# Patient Record
Sex: MALE | Race: AMERICAN INDIAN OR ALASKA NATIVE | NOT HISPANIC OR LATINO | URBAN - METROPOLITAN AREA
[De-identification: names, ages, dates, MRNs, and addresses within clinical notes are randomized per-mention and may not be internally consistent; named-entity substitution may affect disease eponyms.]

---

## 2023-10-30 ENCOUNTER — CLINICAL SUPPORT (OUTPATIENT)
Dept: CARDIAC REHAB | Facility: CLINIC | Age: 78
End: 2023-10-30

## 2023-11-29 ENCOUNTER — CLINICAL SUPPORT (OUTPATIENT)
Dept: CARDIAC REHAB | Facility: CLINIC | Age: 78
End: 2023-11-29

## 2023-11-29 PROCEDURE — 9430000001 HC PHASE III CARDIAC REHAB MONTHLY FEE

## 2023-11-30 NOTE — PROGRESS NOTES
Patient has attended Phase III [facility] at least one time this month. A bill for $55 will be sent out at the end of the month. Thank you for participating in our phase III program.

## 2023-12-04 ENCOUNTER — CLINICAL SUPPORT (OUTPATIENT)
Dept: CARDIAC REHAB | Facility: CLINIC | Age: 78
End: 2023-12-04

## 2023-12-04 PROCEDURE — 9430000001 HC PHASE III CARDIAC REHAB MONTHLY FEE

## 2023-12-10 NOTE — PROGRESS NOTES
Patient has attended Phase III [facility] Rehabilitation at least one time this month (October). A bill for $55 will be sent out at the end of the month. Thank you for participating in our phase III program.

## 2023-12-31 NOTE — PROGRESS NOTES
Patient has attended Phase III [facility] at least one time this month (December). A bill for $55 will be sent out at the end of the month. Thank you for participating in our phase III program.

## 2024-01-24 ENCOUNTER — CLINICAL SUPPORT (OUTPATIENT)
Dept: CARDIAC REHAB | Facility: CLINIC | Age: 79
End: 2024-01-24

## 2024-01-24 PROCEDURE — 9430000001 HC PHASE III CARDIAC REHAB MONTHLY FEE

## 2024-01-31 NOTE — PROGRESS NOTES
Patient has attended Phase III [facility] at least one time this month (January 2024). A bill for $55 will be sent out at the end of the month. Thank you for participating in our phase III program.

## 2024-02-05 ENCOUNTER — CLINICAL SUPPORT (OUTPATIENT)
Dept: CARDIAC REHAB | Facility: CLINIC | Age: 79
End: 2024-02-05

## 2024-02-05 PROCEDURE — 9430000001 HC PHASE III CARDIAC REHAB MONTHLY FEE

## 2024-02-29 NOTE — PROGRESS NOTES
Patient has attended Phase III [facility] at least one time this month (February 2024). A bill for $55 will be sent out at the end of the month. Thank you for participating in our phase III program.

## 2024-03-04 ENCOUNTER — CLINICAL SUPPORT (OUTPATIENT)
Dept: CARDIAC REHAB | Facility: CLINIC | Age: 79
End: 2024-03-04

## 2024-03-04 PROCEDURE — 9430000001 HC PHASE III CARDIAC REHAB MONTHLY FEE

## 2024-03-07 RX ORDER — LATANOPROST 50 UG/ML
SOLUTION/ DROPS OPHTHALMIC
COMMUNITY
Start: 2024-02-05

## 2024-03-08 RX ORDER — LATANOPROST 50 UG/ML
SOLUTION/ DROPS OPHTHALMIC
Qty: 2.5 ML | Refills: 3 | OUTPATIENT
Start: 2024-03-08

## 2024-03-31 NOTE — PROGRESS NOTES
Patient has attended Phase III [facility] at least one time this month (March 2024). A bill for $55 will be sent out at the end of the month. Thank you for participating in our phase III program.    
20

## 2024-04-01 ENCOUNTER — CLINICAL SUPPORT (OUTPATIENT)
Dept: CARDIAC REHAB | Facility: CLINIC | Age: 79
End: 2024-04-01

## 2024-04-01 PROCEDURE — 9430000001 HC PHASE III CARDIAC REHAB MONTHLY FEE

## 2024-04-30 NOTE — PROGRESS NOTES
Patient has attended [facility] III [facility] at least one time this month (April 2024). A bill for $55 will be sent out at the end of the month. Thank you for participating in our phase III program.

## 2024-05-02 ENCOUNTER — CLINICAL SUPPORT (OUTPATIENT)
Dept: CARDIAC REHAB | Facility: CLINIC | Age: 79
End: 2024-05-02

## 2024-05-02 PROCEDURE — 9430000001 HC PHASE III CARDIAC REHAB MONTHLY FEE

## 2024-05-31 NOTE — PROGRESS NOTES
Patient has attended Phase III [facility] at least one time this month (May 2024). A bill for $55 will be sent out at the end of the month. Thank you for participating in our phase III program.

## 2024-06-03 ENCOUNTER — CLINICAL SUPPORT (OUTPATIENT)
Dept: CARDIAC REHAB | Facility: CLINIC | Age: 79
End: 2024-06-03

## 2024-06-03 PROCEDURE — 9430000001 HC PHASE III CARDIAC REHAB MONTHLY FEE

## 2024-06-30 NOTE — PROGRESS NOTES
Patient has attended [facility] III [facility] at least one time this month (June 2024). A bill for $55 will be sent out at the end of the month. Thank you for participating in our phase III program.

## 2024-07-01 ENCOUNTER — CLINICAL SUPPORT (OUTPATIENT)
Dept: CARDIAC REHAB | Facility: CLINIC | Age: 79
End: 2024-07-01

## 2024-07-01 PROCEDURE — 9430000001 HC PHASE III CARDIAC REHAB MONTHLY FEE

## 2024-07-28 NOTE — PROGRESS NOTES
Patient has attended Phase III [facility] at least one time this month (July 2024). A bill for $55 will be sent out at the end of the month. Thank you for participating in our phase III program.

## 2024-08-01 ENCOUNTER — CLINICAL SUPPORT (OUTPATIENT)
Dept: CARDIAC REHAB | Facility: CLINIC | Age: 79
End: 2024-08-01

## 2024-08-01 PROCEDURE — 9430000001 HC PHASE III CARDIAC REHAB MONTHLY FEE

## 2024-09-01 NOTE — PROGRESS NOTES
Patient has attended [facility] III [facility] at least one time this month (August 2024). A bill for $55 will be sent out at the end of the month. Thank you for participating in our phase III program.

## 2024-09-03 ENCOUNTER — CLINICAL SUPPORT (OUTPATIENT)
Dept: CARDIAC REHAB | Facility: CLINIC | Age: 79
End: 2024-09-03

## 2024-09-03 PROCEDURE — 9430000001 HC PHASE III CARDIAC REHAB MONTHLY FEE

## 2024-09-30 NOTE — PROGRESS NOTES
Patient has attended Phase III [facility] at least one time this month (September 2024). A bill for $55 will be sent out at the end of the month. Thank you for participating in our phase III program.

## 2024-10-01 ENCOUNTER — CLINICAL SUPPORT (OUTPATIENT)
Dept: CARDIAC REHAB | Facility: CLINIC | Age: 79
End: 2024-10-01

## 2024-10-01 PROCEDURE — 9430000001 HC PHASE III CARDIAC REHAB MONTHLY FEE

## 2024-11-01 ENCOUNTER — CLINICAL SUPPORT (OUTPATIENT)
Dept: CARDIAC REHAB | Facility: CLINIC | Age: 79
End: 2024-11-01

## 2024-11-01 PROCEDURE — 9430000001 HC PHASE III CARDIAC REHAB MONTHLY FEE

## 2024-11-25 NOTE — PROGRESS NOTES
Patient has attended [facility] III [facility] at least one time this month (November 2024). A bill for $55 will be sent out at the end of the month. Thank you for participating in our phase III program.

## 2024-12-03 ENCOUNTER — CLINICAL SUPPORT (OUTPATIENT)
Dept: CARDIAC REHAB | Facility: CLINIC | Age: 79
End: 2024-12-03

## 2024-12-03 PROCEDURE — 9430000001 HC PHASE III CARDIAC REHAB MONTHLY FEE

## 2025-01-02 ENCOUNTER — CLINICAL SUPPORT (OUTPATIENT)
Dept: CARDIAC REHAB | Facility: CLINIC | Age: 80
End: 2025-01-02

## 2025-01-02 PROCEDURE — 9430000001 HC PHASE III CARDIAC REHAB MONTHLY FEE

## 2025-01-16 NOTE — PROGRESS NOTES
Patient has attended Phase III Cardiac Rehabilitation at least one time this month (January 2025). A bill for $55 will be sent out at the end of the month. Thank you for participating in our phase III program.

## 2025-02-04 ENCOUNTER — CLINICAL SUPPORT (OUTPATIENT)
Dept: CARDIAC REHAB | Facility: CLINIC | Age: 80
End: 2025-02-04

## 2025-02-04 PROCEDURE — 9430000001 HC PHASE III CARDIAC REHAB MONTHLY FEE

## 2025-02-27 NOTE — PROGRESS NOTES
Patient has attended [facility] III [facility] at least one time this month (February 2025). A bill for $55 will be sent out at the end of the month. Thank you for participating in our phase III program.

## 2025-03-04 ENCOUNTER — CLINICAL SUPPORT (OUTPATIENT)
Dept: CARDIAC REHAB | Facility: CLINIC | Age: 80
End: 2025-03-04

## 2025-03-04 PROCEDURE — 9430000001 HC PHASE III CARDIAC REHAB MONTHLY FEE

## 2025-03-27 NOTE — PROGRESS NOTES
Patient has attended [facility] III [facility] at least one time this month (March 2025). A bill for $55 will be sent out at the end of the month. Thank you for participating in our phase III program.

## 2025-04-01 ENCOUNTER — CLINICAL SUPPORT (OUTPATIENT)
Dept: CARDIAC REHAB | Facility: CLINIC | Age: 80
End: 2025-04-01

## 2025-04-01 PROCEDURE — 9430000001 HC PHASE III CARDIAC REHAB MONTHLY FEE

## 2025-04-29 NOTE — PROGRESS NOTES
Patient has attended [facility] III [facility] at least one time this month (April 2025). A bill for $55 will be sent out at the end of the month. Thank you for participating in our phase III program.

## 2025-05-01 ENCOUNTER — CLINICAL SUPPORT (OUTPATIENT)
Dept: CARDIAC REHAB | Facility: CLINIC | Age: 80
End: 2025-05-01

## 2025-05-01 PROCEDURE — 9430000001 HC PHASE III CARDIAC REHAB MONTHLY FEE

## 2025-05-30 NOTE — PROGRESS NOTES
Patient has attended Phase III Card[facility] at least one time this month (May 2025). A bill for $55 will be sent out at the end of the month. Thank you for participating in our phase III program.

## 2025-06-03 ENCOUNTER — CLINICAL SUPPORT (OUTPATIENT)
Dept: CARDIAC REHAB | Facility: CLINIC | Age: 80
End: 2025-06-03

## 2025-06-03 PROCEDURE — 9430000001 HC PHASE III CARDIAC REHAB MONTHLY FEE

## 2025-07-01 ENCOUNTER — CLINICAL SUPPORT (OUTPATIENT)
Dept: CARDIAC REHAB | Facility: CLINIC | Age: 80
End: 2025-07-01

## 2025-07-01 PROCEDURE — 9430000001 HC PHASE III CARDIAC REHAB MONTHLY FEE

## 2025-07-01 NOTE — PROGRESS NOTES
Patient has attended Phase III Card[facility] at least one time this month (June 2025). A bill for $55 will be sent out at the end of the month. Thank you for participating in our phase III program.

## 2025-07-31 NOTE — PROGRESS NOTES
Patient has attended Phase III Cardiac Rehabilitation at least one time this month (July 2025). A bill for $55 will be sent out at the end of the month. Thank you for participating in our phase III program.

## 2025-08-01 ENCOUNTER — CLINICAL SUPPORT (OUTPATIENT)
Dept: CARDIAC REHAB | Facility: CLINIC | Age: 80
End: 2025-08-01